# Patient Record
Sex: FEMALE | Race: WHITE | NOT HISPANIC OR LATINO | Employment: OTHER | URBAN - METROPOLITAN AREA
[De-identification: names, ages, dates, MRNs, and addresses within clinical notes are randomized per-mention and may not be internally consistent; named-entity substitution may affect disease eponyms.]

---

## 2018-10-12 ENCOUNTER — HOSPITAL ENCOUNTER (EMERGENCY)
Facility: MEDICAL CENTER | Age: 53
End: 2018-10-13
Attending: EMERGENCY MEDICINE
Payer: MEDICARE

## 2018-10-12 DIAGNOSIS — R10.13 EPIGASTRIC ABDOMINAL PAIN: ICD-10-CM

## 2018-10-12 DIAGNOSIS — E86.0 DEHYDRATION: ICD-10-CM

## 2018-10-12 LAB
BASOPHILS # BLD AUTO: 0.3 % (ref 0–1.8)
BASOPHILS # BLD: 0.05 K/UL (ref 0–0.12)
EKG IMPRESSION: NORMAL
EKG IMPRESSION: NORMAL
EOSINOPHIL # BLD AUTO: 0.05 K/UL (ref 0–0.51)
EOSINOPHIL NFR BLD: 0.3 % (ref 0–6.9)
ERYTHROCYTE [DISTWIDTH] IN BLOOD BY AUTOMATED COUNT: 39 FL (ref 35.9–50)
HCT VFR BLD AUTO: 46.3 % (ref 37–47)
HGB BLD-MCNC: 16.1 G/DL (ref 12–16)
IMM GRANULOCYTES # BLD AUTO: 0.07 K/UL (ref 0–0.11)
IMM GRANULOCYTES NFR BLD AUTO: 0.4 % (ref 0–0.9)
LYMPHOCYTES # BLD AUTO: 0.59 K/UL (ref 1–4.8)
LYMPHOCYTES NFR BLD: 3.7 % (ref 22–41)
MCH RBC QN AUTO: 30.6 PG (ref 27–33)
MCHC RBC AUTO-ENTMCNC: 34.8 G/DL (ref 33.6–35)
MCV RBC AUTO: 88 FL (ref 81.4–97.8)
MONOCYTES # BLD AUTO: 0.33 K/UL (ref 0–0.85)
MONOCYTES NFR BLD AUTO: 2.1 % (ref 0–13.4)
NEUTROPHILS # BLD AUTO: 14.78 K/UL (ref 2–7.15)
NEUTROPHILS NFR BLD: 93.2 % (ref 44–72)
NRBC # BLD AUTO: 0 K/UL
NRBC BLD-RTO: 0 /100 WBC
PLATELET # BLD AUTO: 369 K/UL (ref 164–446)
PMV BLD AUTO: 10.1 FL (ref 9–12.9)
RBC # BLD AUTO: 5.26 M/UL (ref 4.2–5.4)
WBC # BLD AUTO: 15.9 K/UL (ref 4.8–10.8)

## 2018-10-12 PROCEDURE — 99284 EMERGENCY DEPT VISIT MOD MDM: CPT

## 2018-10-12 PROCEDURE — 80053 COMPREHEN METABOLIC PANEL: CPT

## 2018-10-12 PROCEDURE — 96374 THER/PROPH/DIAG INJ IV PUSH: CPT

## 2018-10-12 PROCEDURE — 700111 HCHG RX REV CODE 636 W/ 250 OVERRIDE (IP): Performed by: EMERGENCY MEDICINE

## 2018-10-12 PROCEDURE — 36415 COLL VENOUS BLD VENIPUNCTURE: CPT

## 2018-10-12 PROCEDURE — 700105 HCHG RX REV CODE 258: Performed by: EMERGENCY MEDICINE

## 2018-10-12 PROCEDURE — 83690 ASSAY OF LIPASE: CPT

## 2018-10-12 PROCEDURE — 93005 ELECTROCARDIOGRAM TRACING: CPT

## 2018-10-12 PROCEDURE — 85025 COMPLETE CBC W/AUTO DIFF WBC: CPT

## 2018-10-12 PROCEDURE — 93005 ELECTROCARDIOGRAM TRACING: CPT | Performed by: EMERGENCY MEDICINE

## 2018-10-12 PROCEDURE — 96375 TX/PRO/DX INJ NEW DRUG ADDON: CPT

## 2018-10-12 RX ORDER — PRAZOSIN HYDROCHLORIDE 5 MG/1
6 CAPSULE ORAL NIGHTLY
COMMUNITY

## 2018-10-12 RX ORDER — SODIUM CHLORIDE 9 MG/ML
1000 INJECTION, SOLUTION INTRAVENOUS ONCE
Status: COMPLETED | OUTPATIENT
Start: 2018-10-12 | End: 2018-10-12

## 2018-10-12 RX ORDER — DIPHENHYDRAMINE HCL 25 MG
25 TABLET ORAL ONCE
Status: DISCONTINUED | OUTPATIENT
Start: 2018-10-12 | End: 2018-10-12

## 2018-10-12 RX ORDER — ONDANSETRON 4 MG/1
4 TABLET, ORALLY DISINTEGRATING ORAL EVERY 6 HOURS PRN
COMMUNITY

## 2018-10-12 RX ORDER — DIPHENHYDRAMINE HYDROCHLORIDE 50 MG/ML
25 INJECTION INTRAMUSCULAR; INTRAVENOUS ONCE
Status: COMPLETED | OUTPATIENT
Start: 2018-10-12 | End: 2018-10-12

## 2018-10-12 RX ORDER — DULOXETIN HYDROCHLORIDE 30 MG/1
50 CAPSULE, DELAYED RELEASE ORAL DAILY
COMMUNITY

## 2018-10-12 RX ORDER — DIPHENHYDRAMINE HYDROCHLORIDE 50 MG/ML
25 INJECTION INTRAMUSCULAR; INTRAVENOUS ONCE
Status: DISCONTINUED | OUTPATIENT
Start: 2018-10-12 | End: 2018-10-12

## 2018-10-12 RX ORDER — METOCLOPRAMIDE HYDROCHLORIDE 5 MG/ML
10 INJECTION INTRAMUSCULAR; INTRAVENOUS ONCE
Status: COMPLETED | OUTPATIENT
Start: 2018-10-12 | End: 2018-10-12

## 2018-10-12 RX ORDER — KETOROLAC TROMETHAMINE 30 MG/ML
15 INJECTION, SOLUTION INTRAMUSCULAR; INTRAVENOUS ONCE
Status: COMPLETED | OUTPATIENT
Start: 2018-10-12 | End: 2018-10-12

## 2018-10-12 RX ADMIN — SODIUM CHLORIDE 1000 ML: 9 INJECTION, SOLUTION INTRAVENOUS at 22:39

## 2018-10-12 RX ADMIN — DIPHENHYDRAMINE HYDROCHLORIDE 25 MG: 50 INJECTION INTRAMUSCULAR; INTRAVENOUS at 23:20

## 2018-10-12 RX ADMIN — METOCLOPRAMIDE 10 MG: 5 INJECTION, SOLUTION INTRAMUSCULAR; INTRAVENOUS at 22:36

## 2018-10-12 RX ADMIN — KETOROLAC TROMETHAMINE 15 MG: 30 INJECTION, SOLUTION INTRAMUSCULAR at 22:36

## 2018-10-12 ASSESSMENT — PAIN SCALES - GENERAL
PAINLEVEL_OUTOF10: 10
PAINLEVEL_OUTOF10: 10

## 2018-10-12 ASSESSMENT — PAIN DESCRIPTION - DESCRIPTORS: DESCRIPTORS: ACHING

## 2018-10-13 VITALS
BODY MASS INDEX: 37.65 KG/M2 | TEMPERATURE: 98.6 F | DIASTOLIC BLOOD PRESSURE: 74 MMHG | HEART RATE: 110 BPM | WEIGHT: 226 LBS | OXYGEN SATURATION: 94 % | HEIGHT: 65 IN | SYSTOLIC BLOOD PRESSURE: 112 MMHG | RESPIRATION RATE: 8 BRPM

## 2018-10-13 LAB
ALBUMIN SERPL BCP-MCNC: 5.1 G/DL (ref 3.2–4.9)
ALBUMIN/GLOB SERPL: 1.5 G/DL
ALP SERPL-CCNC: 107 U/L (ref 30–99)
ALT SERPL-CCNC: 20 U/L (ref 2–50)
ANION GAP SERPL CALC-SCNC: 16 MMOL/L (ref 0–11.9)
AST SERPL-CCNC: 18 U/L (ref 12–45)
BILIRUB SERPL-MCNC: 0.6 MG/DL (ref 0.1–1.5)
BUN SERPL-MCNC: 15 MG/DL (ref 8–22)
CALCIUM SERPL-MCNC: 9.8 MG/DL (ref 8.5–10.5)
CHLORIDE SERPL-SCNC: 107 MMOL/L (ref 96–112)
CO2 SERPL-SCNC: 15 MMOL/L (ref 20–33)
CREAT SERPL-MCNC: 1.05 MG/DL (ref 0.5–1.4)
GLOBULIN SER CALC-MCNC: 3.3 G/DL (ref 1.9–3.5)
GLUCOSE SERPL-MCNC: 199 MG/DL (ref 65–99)
LIPASE SERPL-CCNC: 18 U/L (ref 11–82)
POTASSIUM SERPL-SCNC: 4 MMOL/L (ref 3.6–5.5)
PROT SERPL-MCNC: 8.4 G/DL (ref 6–8.2)
SODIUM SERPL-SCNC: 138 MMOL/L (ref 135–145)

## 2018-10-13 RX ORDER — METOCLOPRAMIDE 5 MG/1
5 TABLET ORAL 3 TIMES DAILY PRN
Qty: 15 TAB | Refills: 0 | Status: SHIPPED | OUTPATIENT
Start: 2018-10-13

## 2018-10-13 ASSESSMENT — PAIN SCALES - GENERAL: PAINLEVEL_OUTOF10: 5

## 2018-10-13 NOTE — ED TRIAGE NOTES
"Claire Oreilly  53 y.o. Female    Chief Complaint   Patient presents with   • Epigastric Pain     Pt reports epigastric pain and abdominal pain all day. Pain is described as 10/10 cramping.    • Nausea/Vomiting/Diarrhea     2 episodes of vomitting today, 1 episode of diarrhea       Pt wheeled to triage following EKG for above CC. Pt bent over in wheelchair, moaning and crying in pain. Charge notified of patient.   Pt is alert and oriented, follows commands and responds appropriately to questions.      Pt returned to lobby, educated on triage process, and to inform staff of any changes or concerns.    Hx: Gastroparesis, GERD, Jaime Esophagus, Diverticulosis, IBS     Blood Pressure: 131/93, Pulse: (!) 130, Respiration: 16, Temperature: 37 °C (98.6 °F), Height: 165.1 cm (5' 5\"), Weight: 102.5 kg (226 lb), BMI (Calculated): 37.61, BSA (Calculated): 2.2, Pulse Oximetry: 97 %    "

## 2018-10-13 NOTE — ED NOTES
Pt discharge to home.  Pt provided with discharge instructions and prescriptions.  Pt verbalized understanding, all questions answered.  VSS upon DC. Pt steady on feet upon DC.

## 2018-10-13 NOTE — ED PROVIDER NOTES
ER Provider Note     Scribed for Andrew Elias M.D. by Carlota Oneal. 10/12/2018, 10:11 PM.    Primary Care Provider: None noted.  Means of Arrival: wheel chair   History obtained from: Patient  History limited by: None     CHIEF COMPLAINT  Chief Complaint   Patient presents with   • Epigastric Pain     Pt reports epigastric pain and abdominal pain all day. Pain is described as 10/10 cramping.    • Nausea/Vomiting/Diarrhea     2 episodes of vomitting today, 1 episode of diarrhea       HPI  Claire Oreilly is a 53 y.o. female with a history of gastroparesis and irritable bowel syndrome who presents to the Emergency Department for evaluation of constant epigastric abdominal pain onset this morning. She describes the pain as cramping and 10/10 in severity. Patient explains that she has been having heart burn and mild chest pain for the past three day.  She confirms associated vomiting and diarrhea. Patient had two episodes of emesis and one episode of diarrhea today. Patient denies any new foods prior to the onset of her symptoms. She explains that she has never received medication for these symptoms in the past. Patient had a nissen fundoplication surgery in 1999. Patient has no history of diabetes.     REVIEW OF SYSTEMS  See HPI for further details. All other systems are negative.     PAST MEDICAL HISTORY   has a past medical history of Jaime esophagus; Diverticulosis; Gastroparesis; GERD (gastroesophageal reflux disease); IBS (irritable bowel syndrome); and Osteoarthritis.    SURGICAL HISTORY  patient denies any surgical history    SOCIAL HISTORY  Social History   Substance Use Topics   • Smoking status: Never Smoker   • Smokeless tobacco: Never Used   • Alcohol use No      History   Drug Use No       FAMILY HISTORY  History reviewed. No pertinent family history.    CURRENT MEDICATIONS  Home Medications     Reviewed by Shilo Rolle R.N. (Registered Nurse) on 10/12/18 at 5128  Med List Status: Partial  "  Medication Last Dose Status   DULoxetine (CYMBALTA) 30 MG Cap DR Particles  Active   ondansetron (ZOFRAN ODT) 4 MG TABLET DISPERSIBLE  Active   prazosin (MINIPRESS) 5 MG Cap  Active                ALLERGIES  Allergies   Allergen Reactions   • Dilaudid [Hydromorphone Hcl] Vomiting   • Morphine Vomiting   • Percocet [Oxycodone-Acetaminophen] Vomiting   • Tape    • Tetracycline Rash     Generalized rash   • Vicodin [Hydrocodone-Acetaminophen] Vomiting       PHYSICAL EXAM  VITAL SIGNS: /74   Pulse (!) 139   Temp 37 °C (98.6 °F) (Temporal)   Resp (!) 22   Ht 1.651 m (5' 5\")   Wt 102.5 kg (226 lb)   SpO2 97%   BMI 37.61 kg/m²       Constitutional: Alert in no moderate distress. Appears to be in moderate to severe pain  HENT: No signs of trauma, Bilateral external ears normal, Nose normal.   Eyes: Pupils are equal and reactive, Conjunctiva normal, Non-icteric.   Neck: Normal range of motion, No tenderness, Supple, No stridor.   Lymphatic: No lymphadenopathy noted.   Cardiovascular: Regular rate and tachycardic, no murmurs.   Thorax & Lungs: Normal breath sounds, No respiratory distress, No wheezing, No chest tenderness.   Abdomen: Bowel sounds normal, Soft, tenderness to palpation of epigatrium, No masses, No pulsatile masses. No peritoneal signs.  Skin: Warm, Dry, No erythema, No rash.   Back: No bony tenderness, No CVA tenderness.   Extremities: Intact distal pulses, No edema, No tenderness, No cyanosis.  Musculoskeletal: Good range of motion in all major joints. No tenderness to palpation or major deformities noted.   Neurologic: Alert , Normal motor function, Normal sensory function, No focal deficits noted.   Psychiatric: Affect normal, Judgment normal, Mood normal.     DIAGNOSTIC STUDIES / PROCEDURES    EKG Interpretation:  Interpreted by me    12 Lead EKG interpreted by me to show:  Sinus tachycardia   Rate 123  Axis: Normal  Intervals: Normal  Normal T waves  Normal ST segments  My impression of this " EKG: Does not indicate ischemia or arrhythmia at this time.     LABS  Labs Reviewed   CBC WITH DIFFERENTIAL - Abnormal; Notable for the following:        Result Value    WBC 15.9 (*)     Hemoglobin 16.1 (*)     Neutrophils-Polys 93.20 (*)     Lymphocytes 3.70 (*)     Neutrophils (Absolute) 14.78 (*)     Lymphs (Absolute) 0.59 (*)     All other components within normal limits   COMP METABOLIC PANEL - Abnormal; Notable for the following:     Co2 15 (*)     Anion Gap 16.0 (*)     Glucose 199 (*)     Alkaline Phosphatase 107 (*)     Albumin 5.1 (*)     Total Protein 8.4 (*)     All other components within normal limits   ESTIMATED GFR - Abnormal; Notable for the following:     GFR If Non  55 (*)     All other components within normal limits   LIPASE   URINALYSIS,CULTURE IF INDICATED     All labs reviewed by me.    COURSE & MEDICAL DECISION MAKING  Pertinent Labs & Imaging studies reviewed. (See chart for details)    This is a 53 y.o. female that presents what appears to be a episode of severe gastroparesis.  I will evaluate the patient for pancreatitis.  I will also treat the patient with antiemetics as well as pain medication for her gastroparesis.    10:11 PM - Patient seen and examined at bedside. Ordered CBC, CMP, lipase, estimated GFR, EKG.  Patient will be medicated with 10 mg Reglan, 15 mg Toradol, 25 mg benadryl for her symptoms. She will receive IV fluids secondary to inability to tolerate oral intake, nausea, and tachycardia.    1:51 AM Patient reevaluated at bedside. She reports improvement of her pain at this time. She was able to tolerate oral intake without any episodes of emesis. Patient's heart rate has improved after administration of IV fluids. Informed that she will be discharged with a prescription for Reglan. Advised to return to the ED with any worsening symptoms. Patient understands and agrees to be discharged.    The patient was found to have a leukocytosis of 15.  She has no  urinary symptoms therefore I would not collect a urinalysis.  Her bicarb is 15.  Her anion gap is 16.  In addition she has a mild AK I.  Fluids have been given and her tachycardia has improved.  She is now tolerating oral intake.  She now has no abdominal pain.  I will send her home with Reglan and strict return precautions.    The patient will return for new or worsening symptoms and is stable at the time of discharge.    DISPOSITION:  Patient will be discharged home in stable condition.    FOLLOW UP:  [facility], Emergency Dept  [facility] [facility] [address] [phone]    If symptoms worsen      OUTPATIENT MEDICATIONS:  New Prescriptions    METOCLOPRAMIDE (REGLAN) 5 MG TABLET    Take 1 Tab by mouth 3 times a day as needed.       FINAL IMPRESSION  1. Dehydration    2. Epigastric abdominal pain          Carlota HAJI (Devibvaishali), am scribing for, and in the presence of, Andrew Elias M.D..    Electronically signed by: Carlota Oneal (Katharine), 10/12/2018    IAndrew M.D. personally performed the services described in this documentation, as scribed by Carlota Oneal in my presence, and it is both accurate and complete. C    The note accurately reflects work and decisions made by me.  Andrew Elias  10/13/2018  3:30 AM